# Patient Record
Sex: FEMALE | Race: WHITE | URBAN - METROPOLITAN AREA
[De-identification: names, ages, dates, MRNs, and addresses within clinical notes are randomized per-mention and may not be internally consistent; named-entity substitution may affect disease eponyms.]

---

## 2017-07-06 ENCOUNTER — HISTORICAL (OUTPATIENT)
Dept: MEDSURG UNIT | Facility: HOSPITAL | Age: 82
End: 2017-07-06

## 2017-07-06 LAB
CROSSMATCH INTERPRETATION: NORMAL
GROUP & RH: NORMAL
HCT VFR BLD AUTO: 20 % (ref 37–47)
HGB BLD-MCNC: 6 GM/DL (ref 12–16)
INR PPP: 1.2 (ref 0–1.27)
IRON SATN MFR SERPL: 4.2 % (ref 20–50)
IRON SERPL-MCNC: 13 MCG/DL (ref 50–175)
PRODUCT READY: NORMAL
PROTHROMBIN TIME: 15 SECOND(S) (ref 12.1–14.2)
TIBC SERPL-MCNC: 313 MCG/DL (ref 250–450)
TRANSFERRIN SERPL-MCNC: 248 MG/DL (ref 200–360)

## 2017-07-07 LAB
ABS NEUT (OLG): 5.78 X10(3)/MCL (ref 2.1–9.2)
ALBUMIN SERPL-MCNC: 3.1 GM/DL (ref 3.4–5)
ALBUMIN/GLOB SERPL: 0.8 RATIO (ref 1.1–2)
ALP SERPL-CCNC: 83 UNIT/L (ref 38–126)
ALT SERPL-CCNC: 37 UNIT/L (ref 12–78)
AST SERPL-CCNC: 24 UNIT/L (ref 15–37)
BASOPHILS # BLD AUTO: 0 X10(3)/MCL (ref 0–0.2)
BASOPHILS NFR BLD AUTO: 0 %
BILIRUB SERPL-MCNC: 0.8 MG/DL (ref 0.2–1)
BILIRUBIN DIRECT+TOT PNL SERPL-MCNC: 0.2 MG/DL (ref 0–0.5)
BILIRUBIN DIRECT+TOT PNL SERPL-MCNC: 0.6 MG/DL (ref 0–0.8)
BUN SERPL-MCNC: 41 MG/DL (ref 7–18)
CALCIUM SERPL-MCNC: 9.5 MG/DL (ref 8.5–10.1)
CHLORIDE SERPL-SCNC: 116 MMOL/L (ref 98–107)
CO2 SERPL-SCNC: 20 MMOL/L (ref 21–32)
CREAT SERPL-MCNC: 1.38 MG/DL (ref 0.55–1.02)
EOSINOPHIL # BLD AUTO: 0 X10(3)/MCL (ref 0–0.9)
EOSINOPHIL NFR BLD AUTO: 0 %
ERYTHROCYTE [DISTWIDTH] IN BLOOD BY AUTOMATED COUNT: 14 % (ref 11.5–17)
GLOBULIN SER-MCNC: 3.8 GM/DL (ref 2.4–3.5)
GLUCOSE SERPL-MCNC: 150 MG/DL (ref 74–106)
HCT VFR BLD AUTO: 42.4 % (ref 37–47)
HGB BLD-MCNC: 14.4 GM/DL (ref 12–16)
LYMPHOCYTES # BLD AUTO: 1.8 X10(3)/MCL (ref 0.6–4.6)
LYMPHOCYTES NFR BLD AUTO: 22 %
MAGNESIUM SERPL-MCNC: 2.2 MG/DL (ref 1.8–2.4)
MCH RBC QN AUTO: 29.1 PG (ref 27–31)
MCHC RBC AUTO-ENTMCNC: 34 GM/DL (ref 33–36)
MCV RBC AUTO: 85.7 FL (ref 80–94)
MONOCYTES # BLD AUTO: 0.6 X10(3)/MCL (ref 0.1–1.3)
MONOCYTES NFR BLD AUTO: 7 %
NEUTROPHILS # BLD AUTO: 5.78 X10(3)/MCL (ref 1.4–7.9)
NEUTROPHILS NFR BLD AUTO: 69 %
PLATELET # BLD AUTO: 240 X10(3)/MCL (ref 130–400)
PMV BLD AUTO: 9.4 FL (ref 9.4–12.4)
POTASSIUM SERPL-SCNC: 4.2 MMOL/L (ref 3.5–5.1)
PROT SERPL-MCNC: 6.9 GM/DL (ref 6.4–8.2)
RBC # BLD AUTO: 4.95 X10(6)/MCL (ref 4.2–5.4)
SODIUM SERPL-SCNC: 148 MMOL/L (ref 136–145)
WBC # SPEC AUTO: 8.4 X10(3)/MCL (ref 4.5–11.5)

## 2017-08-31 ENCOUNTER — HISTORICAL (OUTPATIENT)
Dept: INFUSION THERAPY | Facility: HOSPITAL | Age: 82
End: 2017-08-31

## 2017-08-31 LAB
CROSSMATCH INTERPRETATION: NORMAL
PRODUCT READY: NORMAL
TRANSFUSION ORDER: NORMAL

## 2017-12-05 LAB
CROSSMATCH INTERPRETATION: NORMAL
GROUP & RH: NORMAL
PRODUCT READY: NORMAL

## 2017-12-06 ENCOUNTER — HISTORICAL (OUTPATIENT)
Dept: INFUSION THERAPY | Facility: HOSPITAL | Age: 82
End: 2017-12-06

## 2018-01-23 ENCOUNTER — HISTORICAL (OUTPATIENT)
Dept: INFUSION THERAPY | Facility: HOSPITAL | Age: 83
End: 2018-01-23

## 2018-01-23 LAB
CROSSMATCH INTERPRETATION: NORMAL
PRODUCT READY: NORMAL
TRANSFUSION ORDER: NORMAL

## 2018-05-17 ENCOUNTER — HISTORICAL (OUTPATIENT)
Dept: RADIOLOGY | Facility: HOSPITAL | Age: 83
End: 2018-05-17

## 2022-04-30 NOTE — DISCHARGE SUMMARY
Patient:   Karime Martinez             MRN: [id number]            FIN: [id number]               Age:   87 years     Sex:  Female     :  1930   Associated Diagnoses:   None   Author:   Cooper Guido MD      Date of Service: 2017      Discharge Information      Discharge Summary Information   Date of Admission: 2017  Date of Discharge: 2017  Admit Diagnosis: Anemia          Hyperglycemia          DM II          Hyperkalemia          Metabolic acidosis          CHANDRIKA          HTN          GERD          Cerebrovascular Disease          Dementia  Discharge Diagnosis: Anemia (resolved)           Hyperglycemia (resolved)           DM II            Hyperkalemia (resolved)           Metabolic acidosis (resolved)           CHANDRIKA           HTN           GERD           Cerebrovascular Disease           Dementia  Attending: Cooper Guido MD      Hospital Course   88yo WF transferred to [facility] from [facility] for blood transfusion. Nursing staff reported several recent melanotic stools. Patient unable to communicate due to aphasia, but she seems comfortably in NAD. Nursing staff denies any recent witnessed falls. Patient is bedbound. Workup significant for H/H 6.4/19.9, BUN/Cr 54/1.52, K 5.9, and Bicarb 19. Patient was also noted to have a CBG > 400 as per nursing staff. She was administered insulin IV and Kayexalate. IVF were initiated. She was subsequently transported via EMS to [facility] for direct admission. PMH significant for multiple prior GI bleeds requiring transfusion. The patient had been on full anticoagulation in the past. Medical records review does not show any known h/o Afib or PE/DVT. She has not been on anticoagulants as of late.  Patient was admitted to telemetry. Midline was placed, and she tolerated transfusion of x3 units PRBCs. Sodium bicarb corrected acidosis and hyperkalemia. Subcutaneous insulin normalized CBG. Kidney function remained stable. Metabolic acidosis resolved. BP  improved with resumption of home medications. Iron replacement initiated. Condition deemed stable for transfer back to SNF on 7/7.    Chief Complaint: f/u anemia and CHANDRIKA      Physical Examination      Vital Signs (last 24 hrs)_____  Last Charted___________  Temp Oral             36.5 DegC  (JUL 07 07:34)  Heart Rate Peripheral  65 bpm  (JUL 07 07:34)  Resp Rate       18 br/min  (JUL 07 07:34)  SBP   H 154mmHg  (JUL 07 07:34)  DBP   70 mmHg  (JUL 07 07:34)  SpO2   100 %  (JUL 07 07:34)     General:  No acute distress.    Eye:  Pupils are equal, round and reactive to light, Vision unchanged.         Conjunctiva: Normal  HENT:  Normocephalic, No pharyngeal erythema.    Neck:  Supple.    Respiratory:  Lungs are clear to auscultation, Respirations are non-labored, Breath sounds are equal, Symmetrical chest wall expansion, No chest wall tenderness.    Cardiovascular:  Normal rate, Regular rhythm, No edema.    Gastrointestinal:  Soft, Non-tender, Non-distended.    Integumentary:  Warm, Dry, Intact.         Integumentary exam: No pallor  Neurologic:  Alert.    Cognition and Speech:       Articulation and speech: Expressive aphasia.          Results Review   General results   Today's results   7/7/2017 6:11 CDT        WBC                       8.4 x10(3)/mcL                             RBC                       4.95 x10(6)/mcL                             Hgb                       14.4 gm/dL                             Hct                       42.4 %                             Platelet                  240 x10(3)/mcL                             MCV                       85.7 fL                             MCH                       29.1 pg                             MCHC                      34.0 gm/dL                             RDW                       14.0 %                             MPV                       9.4 fL                             Abs Neut                  5.78 x10(3)/mcL                              Neutro Auto               69 %  NA                             Lymph Auto                22 %  NA                             Mono Auto                 7 %  NA                             Eos Auto                  0 %  NA                             Abs Eos                   0.0 x10(3)/mcL                             Basophil Auto             0 %  NA                             Abs Neutro                5.78 x10(3)/mcL                             Abs Lymph                 1.8 x10(3)/mcL                             Abs Mono                  0.6 x10(3)/mcL                             Abs Baso                  0.0 x10(3)/mcL                             Sodium Lvl                148 mmol/L  HI                             Potassium Lvl             4.2 mmol/L                             Chloride                  116 mmol/L  HI                             CO2                       20.0 mmol/L  LOW                             Calcium Lvl               9.5 mg/dL                             Magnesium Lvl             2.2 mg/dL                             Glucose Lvl               150 mg/dL  HI                             BUN                       41.0 mg/dL  HI                             Creatinine                1.38 mg/dL  HI                             eGFR-AA                   47 mL/min/1.73 m2  NA                             eGFR-LAURA                  38 mL/min/1.73 m2  NA                             Bili Total                0.8 mg/dL                             Bili Direct               0.20 mg/dL                             Bili Indirect             0.60 mg/dL                             AST                       24 unit/L                             ALT                       37 unit/L                             Alk Phos                  83 unit/L                             Total Protein             6.9 gm/dL                             Albumin Lvl               3.10 gm/dL  LOW                             Globulin                   3.80 gm/dL  HI                             A/G Ratio                 0.8 ratio  LOW    7/6/2017 16:00 CDT       Iron Lvl                  13 mcg/dL  LOW                             Transferrin               248.0 mg/dL                             TIBC                      313 mcg/dL                             Iron Sat                  4.2 %  LOW        Discharge Plan   Discharge Summary Plan   Discharge Status: improved.        Location: Discharge to home     Medications: See discharge medicine reconciliation     Activity: Bedrest     Diet: Low sodium     Instructions:  Take all medications as prescribed.          Attend appointments as scheduled.          Return to ED if symptoms worsen, or if t > 100.4.     Follow-up: NH MD in next x7 days    Discussed plan of care, and patient communicated understanding. Agreed to comply with recommendations.    This discharge took 35 minutes to complete.

## 2022-04-30 NOTE — H&P
Patient:   Karime Martinez             MRN: [id number]            FIN: [id number]               Age:   87 years     Sex:  Female     :  1930   Associated Diagnoses:   None   Author:   Cooper Guido MD      Date of Service: 2017      Chief Complaint   Transferred for blood transfusion      History of Present Illness   86yo WF transferred to [facility] from [facility] for blood transfusion. Nursing staff reported several recent melanotic stools. Patient unable to communicate due to aphasia, but she seems comfortably in NAD. Nursing staff denies any recent witnessed falls. Patient is bedbound. Workup significant for H/H 6.4/19.9, BUN/Cr 54/1.52, K 5.9, and Bicarb 19. Patient was also noted to have a CBG > 400 as per nursing staff. She was administered insulin IV and Kayexalate. IVF were initiated. She was subsequently transported via EMS to [facility] for direct admission. PMH significant for multiple prior GI bleeds requiring transfusion. The patient had been on full anticoagulation in the past. Medical records review does not show any known h/o Afib or PE/DVT. She has not been on anticoagulants as of late.  Patient laying comfortably in bed. Denies current pain. Comfortable.      Histories   Past Medical History: CVA with residual aphasia and hemiplegia, HTN, Alzheimer's Dementia, Chronic GERD, HLD, C. diff colitis, DM II, Depression/Anxiety, Carotid artery stenosis s/p CEA, Intracranial bleed s/p craniotomy, Multiple GI bleeds, Anemia s/p multiple prior transfusions   Procedure history: Left foot debridement 2016, CEA, Hysterectomy, Craniotomy 2/2 intracranial bleed, Cholecystectomy   Family History: Noncontributory   Social History     (-) TOB - former smoker.     (-) EtOH.     (-) Illicit drug use.     Resides at [facility].        Health Status   Allergies:    Allergic Reactions (Selected)  Severity Not Documented  Codeine- No reactions were documented.  Demerol HCl- No  reactions were documented.  H/O: penicillin allergy- No reactions were documented.  MetFORMIN- No reactions were documented.   Current medications:  (Selected)   Documented Medications  Documented  Aricept 5 mg oral tablet: 5 mg = 1 tab(s), Oral, Once a day (at bedtime), # 30 tab(s), 0 Refill(s)  Dr Shay Crack Cream: 1 jerry =, TOP, TID, 0 Refill(s)  Melvina-Q: 1 tab(s), Oral, TID, 0 Refill(s)  Lantus 100 units/mL subcutaneous inj.: 10 units, Subcutaneous, Daily, # 10 mL, 0 Refill(s)  Lexapro 20 mg oral tablet: 20 mg = 1 tab(s), Oral, Daily  Lexapro 20 mg oral tablet: 20 mg = 1 tab(s), Oral, Daily, # 30 tab(s), 0 Refill(s)  Megace 40 mg/mL oral suspension: 400 mg = 10 mL, Oral, Daily, 0 Refill(s)  Namenda XR: 28 mg, Oral, Daily, 0 Refill(s)  Neurontin 400 mg oral capsule: 400 mg = 1 cap(s), Oral, BID, 0 Refill(s)  Norvasc 10 mg oral tablet: 10 mg = 1 tab(s), Oral, Daily, # 30 tab(s), 0 Refill(s)  Novolin R: 0 Refill(s)  Protonix 40 mg ORAL enteric coated tablet: 40 mg = 1 tab(s), Oral, Daily, 0 Refill(s)  Zofran 4 mg oral tablet: 4 mg = 1 tab(s), Oral, q6hr, 0 Refill(s)  dexamethasone (for Inj.): 10 mg, IM, qWednesday, 0 Refill(s)  lisinopril 10 mg oral tablet: 10 mg = 1 tab(s), Oral, Daily, # 30 tab(s), 0 Refill(s)  multivitamin with minerals (Adult Tab): 1 tab(s), Daily, 0 Refill(s)      Review of Systems   Unable to obtain:  Due to clinical condition.       Physical Examination      Vital Signs (last 24 hrs)_____  Last Charted___________  Temp Oral             36.7 DegC  (JUL 06 18:50)  Heart Rate Peripheral  79 bpm  (JUL 06 18:50)  Resp Rate       18 br/min  (JUL 06 18:50)  SBP   H 168mmHg  (JUL 06 18:50)  DBP   L 58mmHg  (JUL 06 18:50)  SpO2   100 %  (JUL 06 18:50)     General:  No acute distress.    Eye:  Pupils are equal, round and reactive to light, Vision unchanged.         Conjunctiva: Pale.    HENT:  Normocephalic, No pharyngeal erythema.    Neck:  Supple.    Respiratory:  Lungs are clear to  auscultation, Respirations are non-labored, Breath sounds are equal, Symmetrical chest wall expansion, No chest wall tenderness.    Cardiovascular:  Normal rate, Regular rhythm, No edema.    Gastrointestinal:  Soft, Non-tender, Non-distended.    Integumentary:  Warm, Dry, Intact.         Integumentary exam: Pale.    Neurologic:  Alert.    Cognition and Speech:       Articulation and speech: Expressive aphasia.       Review / Management   Results review:  All Results   7/6/2017 16:00 CDT       Hgb                       6.0 gm/dL  LOW                             Hct                       20.0 %  LOW                             PT                        15.0 second(s)  HI                             INR                       1.20                             Iron Lvl                  13 mcg/dL  LOW                             Transferrin               248.0 mg/dL                             TIBC                      313 mcg/dL                             Iron Sat                  4.2 %  LOW  .       Impression and Plan   86yo WF admitted on 7/6/2017    Anemia  - suspected GI blood loss given h/o multiple prior GI bleeds  - place midlne  - type and match. Transfuse x3 units PRBCs.  - initiate ferrous gluconate 325 mg po bid    Hyperglycemia and DM II  - ISS in place  - Insulin lispro 10 units sq x1 now  - Lantus 15 units qhs  - NS @ 125 mL/hr  - diabetic diet    Hyperkalemia  - Bicarb 650 mg po x2 doses (first dose now)  - already received kayexalate 30 g x1 dose today  - should improve with insulin and IVF  - NS @ 125 mL/hr  - sodium bicarb 650 mg po x2 doses (first dose now)  - Ca Gluconate 2g IV x1 dose now    Metabolic Acidosis  - Sodium Bicarb 650 mg po x2 doses  - CMP with am labs    CHANDRIKA  - NS @ 125 mL/hr x2 L    HTN  - continue home regimen as prescribed  - hydralazine prn    GERD  - protonix 40 mg qday    Cerebrovascular Disease  - residual aphasia and hemiparesis  - fall precautions in place    Dementia  -  continue Aricept and Namenda as prescribed    VTE Prophylaxis: SCDs    CODE STATUS: Full Code  POA: Martine (daughter) [phone]  Internal Medicine: Cooper Guido MD    DISPOSITION: Condition stable. Placing midline and transfusing x3 units PRBCs. Already got Kayexalate. Giving Ca gluconate and sodium bicarb. Giving insulin now. Continue ISS. Fall precautions in place. Labs in am. Overall prognosis is poor. I had a long discussion with her daughter Karime on the telephone. It seems that she is ready to agree to a DNR. She said she will come in the morning to sign documents. Projected discharge on 7/7 with continued clinical improvement.    Total time spent on this encounter including chart review and direct 1-on-1 patient interaction: 83 minutes